# Patient Record
Sex: MALE | Race: WHITE | NOT HISPANIC OR LATINO
[De-identification: names, ages, dates, MRNs, and addresses within clinical notes are randomized per-mention and may not be internally consistent; named-entity substitution may affect disease eponyms.]

---

## 2018-10-31 PROBLEM — Z00.00 ENCOUNTER FOR PREVENTIVE HEALTH EXAMINATION: Status: ACTIVE | Noted: 2018-10-31

## 2018-11-02 ENCOUNTER — RECORD ABSTRACTING (OUTPATIENT)
Age: 68
End: 2018-11-02

## 2018-11-02 DIAGNOSIS — Z82.49 FAMILY HISTORY OF ISCHEMIC HEART DISEASE AND OTHER DISEASES OF THE CIRCULATORY SYSTEM: ICD-10-CM

## 2018-11-02 DIAGNOSIS — Z78.9 OTHER SPECIFIED HEALTH STATUS: ICD-10-CM

## 2018-11-02 DIAGNOSIS — Z82.3 FAMILY HISTORY OF STROKE: ICD-10-CM

## 2018-11-02 DIAGNOSIS — R20.0 ANESTHESIA OF SKIN: ICD-10-CM

## 2018-11-02 DIAGNOSIS — Z84.89 FAMILY HISTORY OF OTHER SPECIFIED CONDITIONS: ICD-10-CM

## 2018-11-02 RX ORDER — ASPIRIN 81 MG/1
81 TABLET ORAL
Refills: 0 | Status: ACTIVE | COMMUNITY

## 2018-11-06 ENCOUNTER — RECORD ABSTRACTING (OUTPATIENT)
Age: 68
End: 2018-11-06

## 2018-11-30 ENCOUNTER — APPOINTMENT (OUTPATIENT)
Dept: CARDIOLOGY | Facility: CLINIC | Age: 68
End: 2018-11-30

## 2019-01-03 ENCOUNTER — RX RENEWAL (OUTPATIENT)
Age: 69
End: 2019-01-03

## 2019-02-22 ENCOUNTER — RX RENEWAL (OUTPATIENT)
Age: 69
End: 2019-02-22

## 2019-06-14 ENCOUNTER — RX RENEWAL (OUTPATIENT)
Age: 69
End: 2019-06-14

## 2019-07-01 ENCOUNTER — APPOINTMENT (OUTPATIENT)
Dept: CARDIOLOGY | Facility: CLINIC | Age: 69
End: 2019-07-01
Payer: COMMERCIAL

## 2019-07-01 ENCOUNTER — NON-APPOINTMENT (OUTPATIENT)
Age: 69
End: 2019-07-01

## 2019-07-01 DIAGNOSIS — E66.9 OBESITY, UNSPECIFIED: ICD-10-CM

## 2019-07-01 PROCEDURE — 93000 ELECTROCARDIOGRAM COMPLETE: CPT

## 2019-07-01 PROCEDURE — 99213 OFFICE O/P EST LOW 20 MIN: CPT

## 2019-07-01 NOTE — DISCUSSION/SUMMARY
[FreeTextEntry1] : The patient is doing very well clinically. 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 The patient does have very good exercise capacity and there have been no cardiac symptoms. Blood pressure is well controlled and review of recent labs is excellent and the electrocardiogram was normal. Based on my examination and evaluation please with the patient's progress him recommending that he continue a program of regular exercise and his current medications.There has been weight gain since her last visit and I again emphasized the importance of weight reduction. We will do treadmill stress testing in 6 months.

## 2019-07-01 NOTE — REASON FOR VISIT
[FreeTextEntry1] : The patient is followed on a preventive mode. He is feeling entirely well and there have been no cardiac symptoms. Patient is very active physically there have been no symptoms of exertional chest pain shortness of breath dizziness lightheadedness or palpitations.

## 2019-07-01 NOTE — PHYSICAL EXAM
[General Appearance - Well Developed] : well developed [Normal Appearance] : normal appearance [Well Groomed] : well groomed [General Appearance - Well Nourished] : well nourished [No Deformities] : no deformities [General Appearance - In No Acute Distress] : no acute distress [Normal Conjunctiva] : the conjunctiva exhibited no abnormalities [Eyelids - No Xanthelasma] : the eyelids demonstrated no xanthelasmas [Normal Oral Mucosa] : normal oral mucosa [No Oral Pallor] : no oral pallor [No Oral Cyanosis] : no oral cyanosis [Normal Jugular Venous A Waves Present] : normal jugular venous A waves present [Normal Jugular Venous V Waves Present] : normal jugular venous V waves present [No Jugular Venous Zhu A Waves] : no jugular venous zhu A waves [Respiration, Rhythm And Depth] : normal respiratory rhythm and effort [Exaggerated Use Of Accessory Muscles For Inspiration] : no accessory muscle use [Auscultation Breath Sounds / Voice Sounds] : lungs were clear to auscultation bilaterally [Heart Rate And Rhythm] : heart rate and rhythm were normal [Heart Sounds] : normal S1 and S2 [Murmurs] : no murmurs present [Abdomen Soft] : soft [Abdomen Tenderness] : non-tender [Abdomen Mass (___ Cm)] : no abdominal mass palpated [Abnormal Walk] : normal gait [Gait - Sufficient For Exercise Testing] : the gait was sufficient for exercise testing [Nail Clubbing] : no clubbing of the fingernails [Cyanosis, Localized] : no localized cyanosis [Petechial Hemorrhages (___cm)] : no petechial hemorrhages [Skin Color & Pigmentation] : normal skin color and pigmentation [] : no rash [No Venous Stasis] : no venous stasis [Skin Lesions] : no skin lesions [No Skin Ulcers] : no skin ulcer [No Xanthoma] : no  xanthoma was observed [Oriented To Time, Place, And Person] : oriented to person, place, and time [Affect] : the affect was normal [Mood] : the mood was normal [No Anxiety] : not feeling anxious

## 2020-01-27 ENCOUNTER — APPOINTMENT (OUTPATIENT)
Dept: CARDIOLOGY | Facility: CLINIC | Age: 70
End: 2020-01-27
Payer: COMMERCIAL

## 2020-01-27 VITALS
SYSTOLIC BLOOD PRESSURE: 130 MMHG | DIASTOLIC BLOOD PRESSURE: 70 MMHG | HEIGHT: 71 IN | WEIGHT: 238 LBS | HEART RATE: 62 BPM | BODY MASS INDEX: 33.32 KG/M2

## 2020-01-27 PROCEDURE — 93015 CV STRESS TEST SUPVJ I&R: CPT

## 2020-01-27 PROCEDURE — 99212 OFFICE O/P EST SF 10 MIN: CPT | Mod: 25

## 2020-01-27 NOTE — REASON FOR VISIT
[FreeTextEntry1] : Patient returns today for followup and stress testing. He denies any recent cardiac symptoms. There have been no symptoms of chest pain shortness of breath dizziness lightheadedness or palpitations. The patient has been pursuing an academic degree and exercise lately has been limited.

## 2020-01-27 NOTE — DISCUSSION/SUMMARY
[FreeTextEntry1] : The patient's clinical condition is stable. His cardiovascular stress test today revealed no clinical evidence of exercise-induced myocardial ischemia. Today's emphasis is on resumption of exercise and weight reduction.

## 2020-07-29 ENCOUNTER — APPOINTMENT (OUTPATIENT)
Dept: CARDIOLOGY | Facility: CLINIC | Age: 70
End: 2020-07-29
Payer: COMMERCIAL

## 2020-07-29 VITALS
HEIGHT: 69 IN | BODY MASS INDEX: 32.88 KG/M2 | WEIGHT: 222 LBS | DIASTOLIC BLOOD PRESSURE: 60 MMHG | HEART RATE: 55 BPM | SYSTOLIC BLOOD PRESSURE: 112 MMHG

## 2020-07-29 PROCEDURE — 99213 OFFICE O/P EST LOW 20 MIN: CPT

## 2020-07-29 PROCEDURE — 93000 ELECTROCARDIOGRAM COMPLETE: CPT

## 2020-07-29 NOTE — REASON FOR VISIT
[FreeTextEntry1] : The patient is followed with principal diagnoses of hypertension and hyperlipidemia.

## 2020-07-29 NOTE — DISCUSSION/SUMMARY
[FreeTextEntry1] : Patient continues to do extremely well clinically. There have been no acute cardiac symptoms. There have been no symptoms of chest pain shortness of breath dizziness lightheadedness or palpitations. He does exercise on his own for about 3 miles per day. Based on my examination assessment one the patient's cardiovascular status to be stable. Today's blood pressure is 112/60 the electrocardiogram shows normal.

## 2020-07-29 NOTE — PHYSICAL EXAM
[General Appearance - Well Developed] : well developed [Normal Appearance] : normal appearance [Well Groomed] : well groomed [General Appearance - Well Nourished] : well nourished [No Deformities] : no deformities [General Appearance - In No Acute Distress] : no acute distress [Eyelids - No Xanthelasma] : the eyelids demonstrated no xanthelasmas [Normal Conjunctiva] : the conjunctiva exhibited no abnormalities [Normal Oral Mucosa] : normal oral mucosa [No Oral Pallor] : no oral pallor [No Oral Cyanosis] : no oral cyanosis [Normal Jugular Venous A Waves Present] : normal jugular venous A waves present [No Jugular Venous Zhu A Waves] : no jugular venous zhu A waves [Normal Jugular Venous V Waves Present] : normal jugular venous V waves present [Auscultation Breath Sounds / Voice Sounds] : lungs were clear to auscultation bilaterally [Exaggerated Use Of Accessory Muscles For Inspiration] : no accessory muscle use [Respiration, Rhythm And Depth] : normal respiratory rhythm and effort [Heart Sounds] : normal S1 and S2 [Heart Rate And Rhythm] : heart rate and rhythm were normal [Abdomen Soft] : soft [Murmurs] : no murmurs present [Abdomen Tenderness] : non-tender [Abdomen Mass (___ Cm)] : no abdominal mass palpated [Gait - Sufficient For Exercise Testing] : the gait was sufficient for exercise testing [Abnormal Walk] : normal gait [Cyanosis, Localized] : no localized cyanosis [Nail Clubbing] : no clubbing of the fingernails [Petechial Hemorrhages (___cm)] : no petechial hemorrhages [Skin Color & Pigmentation] : normal skin color and pigmentation [No Venous Stasis] : no venous stasis [] : no rash [Skin Lesions] : no skin lesions [No Skin Ulcers] : no skin ulcer [Oriented To Time, Place, And Person] : oriented to person, place, and time [No Xanthoma] : no  xanthoma was observed [Affect] : the affect was normal [Mood] : the mood was normal [No Anxiety] : not feeling anxious

## 2021-04-19 ENCOUNTER — RX RENEWAL (OUTPATIENT)
Age: 71
End: 2021-04-19

## 2021-04-25 ENCOUNTER — RX RENEWAL (OUTPATIENT)
Age: 71
End: 2021-04-25

## 2021-09-21 ENCOUNTER — APPOINTMENT (OUTPATIENT)
Dept: CARDIOLOGY | Facility: CLINIC | Age: 71
End: 2021-09-21
Payer: COMMERCIAL

## 2021-09-21 VITALS
WEIGHT: 228 LBS | SYSTOLIC BLOOD PRESSURE: 118 MMHG | DIASTOLIC BLOOD PRESSURE: 70 MMHG | TEMPERATURE: 98.1 F | BODY MASS INDEX: 33.77 KG/M2 | HEART RATE: 61 BPM | HEIGHT: 69 IN

## 2021-09-21 DIAGNOSIS — I10 ESSENTIAL (PRIMARY) HYPERTENSION: ICD-10-CM

## 2021-09-21 DIAGNOSIS — E78.5 HYPERLIPIDEMIA, UNSPECIFIED: ICD-10-CM

## 2021-09-21 DIAGNOSIS — R94.39 ABNORMAL RESULT OF OTHER CARDIOVASCULAR FUNCTION STUDY: ICD-10-CM

## 2021-09-21 PROCEDURE — 93015 CV STRESS TEST SUPVJ I&R: CPT

## 2021-09-21 PROCEDURE — 99212 OFFICE O/P EST SF 10 MIN: CPT | Mod: 25

## 2021-09-21 NOTE — HISTORY OF PRESENT ILLNESS
[FreeTextEntry1] : Patient's clinical condition has been stable. He has had no symptoms of chest pain or discomfort. No significant shortness of breath or palpitations. His exercise has been limited recently due to an old knee injury that caused a torn meniscus. Could not be completely repaired with arthroscopic surgery.

## 2021-09-21 NOTE — PHYSICAL EXAM
[Well Developed] : well developed [Well Nourished] : well nourished [No Acute Distress] : no acute distress [Normal Venous Pressure] : normal venous pressure [Normal Conjunctiva] : normal conjunctiva [No Carotid Bruit] : no carotid bruit [Normal S1, S2] : normal S1, S2 [No Murmur] : no murmur [No Rub] : no rub [No Gallop] : no gallop [Clear Lung Fields] : clear lung fields [Good Air Entry] : good air entry [No Respiratory Distress] : no respiratory distress  [Normal Gait] : normal gait [No Edema] : no edema [No Cyanosis] : no cyanosis [No Clubbing] : no clubbing [No Varicosities] : no varicosities [No Rash] : no rash [No Skin Lesions] : no skin lesions [Moves all extremities] : moves all extremities [No Focal Deficits] : no focal deficits [Normal Speech] : normal speech [Alert and Oriented] : alert and oriented [Normal memory] : normal memory [de-identified] : overweight

## 2021-09-21 NOTE — DISCUSSION/SUMMARY
[FreeTextEntry1] : The patient's clinical condition is stable. There has been some weight gain as a result  restricted exercise. His blood pressure has been very well controlled on the current regimen. Today stress test is similar to previous studies there were borderline upsloping st depressions at maximum exercise there was no clinical evidence of exercise-induced myocardial ischemia or arrhythmias.\par As for my evaluation and assessment upon the patient's cardiac status to be stable. The current medications will be continued.\par The patient will be seeing his primary care physician tomorrow at which time labs including lipid profile will be drawn and the results will be reviewed.I,m planning to do stress echo at the next visit.

## 2021-09-21 NOTE — REASON FOR VISIT
[FreeTextEntry1] : The patient is followed in a preventive mode with the principal coronary risk factors of hypertension and hyperlipidemia.

## 2021-10-18 ENCOUNTER — RX RENEWAL (OUTPATIENT)
Age: 71
End: 2021-10-18

## 2022-01-15 ENCOUNTER — RX RENEWAL (OUTPATIENT)
Age: 72
End: 2022-01-15

## 2022-06-27 ENCOUNTER — RX RENEWAL (OUTPATIENT)
Age: 72
End: 2022-06-27

## 2022-06-27 RX ORDER — OLMESARTAN MEDOXOMIL 20 MG/1
20 TABLET, FILM COATED ORAL DAILY
Qty: 90 | Refills: 3 | Status: ACTIVE | COMMUNITY
Start: 2021-04-19 | End: 1900-01-01

## 2022-08-03 ENCOUNTER — RX RENEWAL (OUTPATIENT)
Age: 72
End: 2022-08-03

## 2022-08-03 RX ORDER — AMLODIPINE BESYLATE 5 MG/1
5 TABLET ORAL
Qty: 90 | Refills: 1 | Status: ACTIVE | COMMUNITY
Start: 2019-06-14 | End: 1900-01-01

## 2022-08-18 RX ORDER — ATORVASTATIN CALCIUM 10 MG/1
10 TABLET, FILM COATED ORAL DAILY
Qty: 90 | Refills: 3 | Status: ACTIVE | COMMUNITY
Start: 1900-01-01 | End: 1900-01-01

## 2022-09-30 ENCOUNTER — APPOINTMENT (OUTPATIENT)
Dept: CARDIOLOGY | Facility: CLINIC | Age: 72
End: 2022-09-30